# Patient Record
Sex: FEMALE | Race: NATIVE HAWAIIAN OR OTHER PACIFIC ISLANDER | NOT HISPANIC OR LATINO | Employment: UNEMPLOYED | URBAN - METROPOLITAN AREA
[De-identification: names, ages, dates, MRNs, and addresses within clinical notes are randomized per-mention and may not be internally consistent; named-entity substitution may affect disease eponyms.]

---

## 2023-03-01 ENCOUNTER — HOSPITAL ENCOUNTER (EMERGENCY)
Facility: MEDICAL CENTER | Age: 28
End: 2023-03-02
Attending: EMERGENCY MEDICINE
Payer: COMMERCIAL

## 2023-03-01 DIAGNOSIS — R45.851 SUICIDAL IDEATION: ICD-10-CM

## 2023-03-01 DIAGNOSIS — F32.2 CURRENT SEVERE EPISODE OF MAJOR DEPRESSIVE DISORDER WITHOUT PSYCHOTIC FEATURES WITHOUT PRIOR EPISODE (HCC): ICD-10-CM

## 2023-03-01 LAB
ALBUMIN SERPL BCP-MCNC: 4.8 G/DL (ref 3.2–4.9)
ALBUMIN/GLOB SERPL: 1.8 G/DL
ALP SERPL-CCNC: 64 U/L (ref 30–99)
ALT SERPL-CCNC: 16 U/L (ref 2–50)
AMPHET UR QL SCN: NEGATIVE
ANION GAP SERPL CALC-SCNC: 12 MMOL/L (ref 7–16)
APPEARANCE UR: CLEAR
AST SERPL-CCNC: 20 U/L (ref 12–45)
BACTERIA #/AREA URNS HPF: NEGATIVE /HPF
BARBITURATES UR QL SCN: NEGATIVE
BENZODIAZ UR QL SCN: NEGATIVE
BILIRUB SERPL-MCNC: 0.4 MG/DL (ref 0.1–1.5)
BILIRUB UR QL STRIP.AUTO: NEGATIVE
BUN SERPL-MCNC: 11 MG/DL (ref 8–22)
BZE UR QL SCN: NEGATIVE
CALCIUM ALBUM COR SERPL-MCNC: 9 MG/DL (ref 8.5–10.5)
CALCIUM SERPL-MCNC: 9.6 MG/DL (ref 8.5–10.5)
CANNABINOIDS UR QL SCN: NEGATIVE
CHLORIDE SERPL-SCNC: 102 MMOL/L (ref 96–112)
CO2 SERPL-SCNC: 22 MMOL/L (ref 20–33)
COLOR UR: YELLOW
CREAT SERPL-MCNC: 0.69 MG/DL (ref 0.5–1.4)
EPI CELLS #/AREA URNS HPF: ABNORMAL /HPF
ERYTHROCYTE [DISTWIDTH] IN BLOOD BY AUTOMATED COUNT: 41.7 FL (ref 35.9–50)
ETHANOL BLD-MCNC: <10.1 MG/DL
GFR SERPLBLD CREATININE-BSD FMLA CKD-EPI: 121 ML/MIN/1.73 M 2
GLOBULIN SER CALC-MCNC: 2.7 G/DL (ref 1.9–3.5)
GLUCOSE SERPL-MCNC: 83 MG/DL (ref 65–99)
GLUCOSE UR STRIP.AUTO-MCNC: NEGATIVE MG/DL
HCG SERPL QL: NEGATIVE
HCT VFR BLD AUTO: 41.3 % (ref 37–47)
HGB BLD-MCNC: 13.5 G/DL (ref 12–16)
HYALINE CASTS #/AREA URNS LPF: ABNORMAL /LPF
KETONES UR STRIP.AUTO-MCNC: 40 MG/DL
LEUKOCYTE ESTERASE UR QL STRIP.AUTO: NEGATIVE
MCH RBC QN AUTO: 28.9 PG (ref 27–33)
MCHC RBC AUTO-ENTMCNC: 32.7 G/DL (ref 33.6–35)
MCV RBC AUTO: 88.4 FL (ref 81.4–97.8)
METHADONE UR QL SCN: NEGATIVE
MICRO URNS: ABNORMAL
NITRITE UR QL STRIP.AUTO: NEGATIVE
OPIATES UR QL SCN: NEGATIVE
OXYCODONE UR QL SCN: NEGATIVE
PCP UR QL SCN: NEGATIVE
PH UR STRIP.AUTO: 6 [PH] (ref 5–8)
PLATELET # BLD AUTO: 241 K/UL (ref 164–446)
PMV BLD AUTO: 10.4 FL (ref 9–12.9)
POTASSIUM SERPL-SCNC: 4 MMOL/L (ref 3.6–5.5)
PROPOXYPH UR QL SCN: NEGATIVE
PROT SERPL-MCNC: 7.5 G/DL (ref 6–8.2)
PROT UR QL STRIP: NEGATIVE MG/DL
RBC # BLD AUTO: 4.67 M/UL (ref 4.2–5.4)
RBC # URNS HPF: ABNORMAL /HPF
RBC UR QL AUTO: ABNORMAL
SODIUM SERPL-SCNC: 136 MMOL/L (ref 135–145)
SP GR UR STRIP.AUTO: 1.02
TSH SERPL DL<=0.005 MIU/L-ACNC: 2.12 UIU/ML (ref 0.38–5.33)
UROBILINOGEN UR STRIP.AUTO-MCNC: 0.2 MG/DL
WBC # BLD AUTO: 9.1 K/UL (ref 4.8–10.8)
WBC #/AREA URNS HPF: ABNORMAL /HPF

## 2023-03-01 PROCEDURE — 84443 ASSAY THYROID STIM HORMONE: CPT

## 2023-03-01 PROCEDURE — 81001 URINALYSIS AUTO W/SCOPE: CPT

## 2023-03-01 PROCEDURE — 80307 DRUG TEST PRSMV CHEM ANLYZR: CPT

## 2023-03-01 PROCEDURE — 84703 CHORIONIC GONADOTROPIN ASSAY: CPT

## 2023-03-01 PROCEDURE — 80053 COMPREHEN METABOLIC PANEL: CPT

## 2023-03-01 PROCEDURE — 82077 ASSAY SPEC XCP UR&BREATH IA: CPT

## 2023-03-01 PROCEDURE — 85027 COMPLETE CBC AUTOMATED: CPT

## 2023-03-01 PROCEDURE — 36415 COLL VENOUS BLD VENIPUNCTURE: CPT

## 2023-03-01 PROCEDURE — 99285 EMERGENCY DEPT VISIT HI MDM: CPT

## 2023-03-02 VITALS
BODY MASS INDEX: 30.42 KG/M2 | HEART RATE: 78 BPM | DIASTOLIC BLOOD PRESSURE: 79 MMHG | SYSTOLIC BLOOD PRESSURE: 121 MMHG | WEIGHT: 193.78 LBS | TEMPERATURE: 97 F | HEIGHT: 67 IN | RESPIRATION RATE: 16 BRPM | OXYGEN SATURATION: 99 %

## 2023-03-02 RX ORDER — AMOXICILLIN 250 MG
2 CAPSULE ORAL DAILY
COMMUNITY

## 2023-03-02 NOTE — ED NOTES
Pt resting on gurney with eyes closed. Respirations even and unlabored. 1:1 sitter at bedside in direct pt view.

## 2023-03-02 NOTE — DISCHARGE PLANNING
Alert Team Note:    Contacted [facility], spoke to Leydi. Pt is on the pending list. No beds available at this time. Facility is expecting discharges later today.

## 2023-03-02 NOTE — ED NOTES
Patient resting on stretcher in no acute distress. Equal chest rise and fall noted. Sitter in doorway for direct 1:1 observation. Room safety checklist in use. No needs at this time, will continue to monitor

## 2023-03-02 NOTE — CONSULTS
"[facility]   TRIAGE ASSESSMENT    Name: Martin Ayers  MRN: [id number]  : 1995  Age: 27 y.o.  Date of assessment: 3/1/2023  PCP: No primary care provider on file.  Persons in attendance: Patient  Patient Location: [facility]    CHIEF COMPLAINT/PRESENTING ISSUE (as stated by pt, pts father):   Chief Complaint   Patient presents with    Psych Eval     Pt not feeling \"herself\", depressed, feels lost x2 months. Pt states these feelings come and go. Pt see's Psychiatrist Dr. Howard Castaneda.  Pt has flat affect in triage.    Suicidal Ideation     New thoughts of wanting to harm herself with a sharp object to cut herself. Pt has not had thoughts like these in the past.     PT BIB family after psychiatrist recommended that she go to ED d/t increasing hopelessness, depression, and suicidal thoughts.  Psychiatrist  also concerned with increasing possible psychosomatic symptoms such as twitches, grunts, moans and wished patient to have lab work done. PT presents as A + O x 4 with calm mood, flat affect, and appears to have trouble articulating feelings. PT and pt's father reports that patient has become more and more depressed since last August. PT reports that she has been having increasing thoughts of self harm (such as cutting herself or hitting herself with a hammer) but has not acted on it. PT also reports intermittent thoughts of suicide reporting that she climbed a bridge last month before aborting attempt. PT reports that she began seeing a psychiatrist, Dr Howard Castaneda ([phone]), and saw him for the second time earlier today. Other than that, pt has no psychiatric history and no inpatient history, denies taking any medications. Denies any drug use and reports that she consumes alcohol \"on special occasions.\" After consulting with ERP, pt would benefit from inpatient hospitalization.       CURRENT LIVING SITUATION/SOCIAL SUPPORT/FINANCIAL RESOURCES: PT lives with family and works " part-time for her [employer] company as a .    BEHAVIORAL HEALTH/SUBSTANCE USE TREATMENT HISTORY  Does patient/parent report a history of prior behavioral health/substance use treatment for patient?   No:    SAFETY ASSESSMENT - SELF  Does patient acknowledge current or past symptoms of dangerousness to self or is previous history noted? Yes -  pt has been experiencing  hopelessness, depression, and suicidal thoughts.   Does parent/significant other report patient has current or past symptoms of dangerousness to self? Yes - PT's father reports SI,   Does presenting problem suggest symptoms of dangerousness to self? Yes:     Past Current    Suicidal Thoughts: [x]  [x]    Suicidal Plans: [x]  [x]    Suicidal Intent: [x]  []    Suicide Attempts: []  []    Self-Injury []  []      For any boxes checked above, provide detail:     History of suicide by family member: no  History of suicide by friend/significant other: no  Recent change in frequency/specificity/intensity of suicidal thoughts or self-harm behavior? no  Current access to firearms, medications, or other identified means of suicide/self-harm? no  If yes, willing to restrict access to means of suicide/self-harm? no  Protective factors present:  Willing to address in treatment    SAFETY ASSESSMENT - OTHERS  Does patient acknowledge current or past symptoms of aggressive behavior or risk to others or is previous history noted? no  Does parent/significant other report patient has current or past symptoms of aggressive behavior or risk to others?  no  Does presenting problem suggest symptoms of dangerousness to others? No    LEGAL HISTORY  Does patient acknowledge history of arrest/[facility] or is previous history noted? no    Crisis Safety Plan completed and copy given to patient? N\A    ABUSE/NEGLECT SCREENING  Does patient report feeling “unsafe” in his/her home, or afraid of anyone?  no  Does patient report any history of physical, sexual,  "or emotional abuse?  no  Does parent or significant other report any of the above? no  Is there evidence of neglect by self?  no  Is there evidence of neglect by a caregiver? no  Does the patient/parent report any history of CPS/APS/police involvement related to suspected abuse/neglect or domestic violence? no  Based on the information provided during the current assessment, is a mandated report of suspected abuse/neglect being made?  No    SUBSTANCE USE SCREENING  Yes:  Dex all substances used in the past 30 days:    Denies any drug use and reports that she consumes alcohol \"on special occasions.\"       Last Use Amount   []   Alcohol Unknown Unknown    []   Marijuana     []   Heroin     []   Prescription Opioids  (used without prescription, for    recreation, or in excess of prescribed amount)     []   Other Prescription  (used without prescription, for    recreation, or in excess of prescribed amount)     []   Cocaine      []   Methamphetamine     []   \"\" drugs (ectasy, MDMA)     []   Other substances        UDS results: Negative  Breathalyzer results: 0.00      MENTAL STATUS   Participation: Limited verbal participation  Grooming: Casual  Orientation: Alert and Fully Oriented  Behavior: Hypoactive  Eye contact: Limited  Mood: Depressed  Affect: Constricted, Flat, and Sad  Thought process: Logical and Goal-directed  Thought content: Within normal limits  Speech: Rate within normal limits and Volume within normal limits  Perception: Within normal limits  Memory:  Recent:  Adequate  Insight: Adequate  Judgment:  Limited  Other:    Collateral information:   Source:  [] Significant other present in person:   [] Significant other by telephone  [] [facility]   [x] [facility] Nursing Staff  [x] [facility]  [x] Other: ERP    [] Unable to complete full assessment due to:  [] Acute intoxication  [] Patient declined to participate/engage  [] Patient verbally unresponsive  [] Significant cognitive " deficits  [] Significant perceptual distortions or behavioral disorganization  [] Other:      CLINICAL IMPRESSIONS:  Primary:  SI  Secondary:  MDD       IDENTIFIED NEEDS/PLAN:  [Trigger DISPOSITION list for any items marked]    [x]  Imminent safety risk - self [] Imminent safety risk - others   []  Acute substance withdrawal []  Psychosis/Impaired reality testing   [x]  Mood/anxiety []  Substance use/Addictive behavior   [x]  Maladaptive behaviro []  Parent/child conflict   []  Family/Couples conflict []  Biomedical   []  Housing []  Financial   []   Legal  Occupational/Educational   []  Domestic violence []  Other:     Recommended Plan of Care:   Actively being addressed by Legal Hold and [facility] and 1:1 Observation; [facility]; Continue pt level of observation per the [place] Suicide Severity Rating Scale (C-SSRS) assessment completed by [facility] ED RN, currently at medium risk; [employer], [facility]  *Telesitter may not be utilized for moderate or high risk patients    Has the Recommended Plan of Care/Level of Observation been reviewed with the patient's assigned nurse? yes    Does patient/parent or guardian express agreement with the above plan? yes      Referral appointment(s) scheduled? N\A    Alert team only:   I have discussed findings and recommendations with Dr. Richardson who is in agreement with these recommendations.     Referral information sent to the following outpatient community providers :    Referral information sent to the following inpatient community providers :    If applicable : Referred  to  Alert Team for legal hold follow up at (time): JESSICA Zheng R.N.  3/1/2023

## 2023-03-02 NOTE — ED NOTES
Patient reassessed. VSS. Denies any changes at this time. Patient in no signs of distress. Pt remains in 1:1 observation of sitter

## 2023-03-02 NOTE — ED NOTES
Patient resting on gurney. No acute distress. Active chest rise noted. No agitation noted. No behavioral pain indicators. 1:1 sitter in direct view of patient.

## 2023-03-02 NOTE — PROGRESS NOTES
"ED Observation Progress Note    Date of Service: 03/02/23    Interval History and Interventions  Patient presenting with suicidal ideation and worsening depression.  She has been placed on a legal hold and medically cleared and pending transfer to inpatient psychiatric facility    No issues through the shift today, patient's care will be transferred to the oncoming emergency physician    Physical Exam  /71   Pulse 72   Temp 36.4 °C (97.6 °F) (Temporal)   Resp 16   Ht 1.702 m (5' 7\")   Wt 87.9 kg (193 lb 12.6 oz)   SpO2 99%   BMI 30.35 kg/m² .    Constitutional: Awake and alert. Nontoxic  HENT:  Grossly normal  Eyes: Grossly normal  Neck: Normal range of motion  Cardiovascular: Normal heart rate   Thorax & Lungs: No respiratory distress  Abdomen: Nontender  Skin:  No pathologic rash.   Extremities: Well perfused  Psychiatric: Depressed    Labs  Results for orders placed or performed during the hospital encounter of 03/01/23   CBC WITHOUT DIFFERENTIAL   Result Value Ref Range    WBC 9.1 4.8 - 10.8 K/uL    RBC 4.67 4.20 - 5.40 M/uL    Hemoglobin 13.5 12.0 - 16.0 g/dL    Hematocrit 41.3 37.0 - 47.0 %    MCV 88.4 81.4 - 97.8 fL    MCH 28.9 27.0 - 33.0 pg    MCHC 32.7 (L) 33.6 - 35.0 g/dL    RDW 41.7 35.9 - 50.0 fL    Platelet Count 241 164 - 446 K/uL    MPV 10.4 9.0 - 12.9 fL   COMP METABOLIC PANEL   Result Value Ref Range    Sodium 136 135 - 145 mmol/L    Potassium 4.0 3.6 - 5.5 mmol/L    Chloride 102 96 - 112 mmol/L    Co2 22 20 - 33 mmol/L    Anion Gap 12.0 7.0 - 16.0    Glucose 83 65 - 99 mg/dL    Bun 11 8 - 22 mg/dL    Creatinine 0.69 0.50 - 1.40 mg/dL    Calcium 9.6 8.5 - 10.5 mg/dL    AST(SGOT) 20 12 - 45 U/L    ALT(SGPT) 16 2 - 50 U/L    Alkaline Phosphatase 64 30 - 99 U/L    Total Bilirubin 0.4 0.1 - 1.5 mg/dL    Albumin 4.8 3.2 - 4.9 g/dL    Total Protein 7.5 6.0 - 8.2 g/dL    Globulin 2.7 1.9 - 3.5 g/dL    A-G Ratio 1.8 g/dL   HCG QUAL SERUM   Result Value Ref Range    Beta-Hcg Qualitative Serum " Negative Negative   URINALYSIS (UA)    Specimen: Urine   Result Value Ref Range    Color Yellow     Character Clear     Specific Gravity 1.023 <1.035    Ph 6.0 5.0 - 8.0    Glucose Negative Negative mg/dL    Ketones 40 (A) Negative mg/dL    Protein Negative Negative mg/dL    Bilirubin Negative Negative    Urobilinogen, Urine 0.2 Negative    Nitrite Negative Negative    Leukocyte Esterase Negative Negative    Occult Blood Small (A) Negative    Micro Urine Req Microscopic    URINE DRUG SCREEN   Result Value Ref Range    Amphetamines Urine Negative Negative    Barbiturates Negative Negative    Benzodiazepines Negative Negative    Cocaine Metabolite Negative Negative    Methadone Negative Negative    Opiates Negative Negative    Oxycodone Negative Negative    Phencyclidine -Pcp Negative Negative    Propoxyphene Negative Negative    Cannabinoid Metab Negative Negative   DIAGNOSTIC ALCOHOL   Result Value Ref Range    Diagnostic Alcohol <10.1 <10.1 mg/dL   TSH WITH REFLEX TO FT4   Result Value Ref Range    TSH 2.120 0.380 - 5.330 uIU/mL   CORRECTED CALCIUM   Result Value Ref Range    Correct Calcium 9.0 8.5 - 10.5 mg/dL   ESTIMATED GFR   Result Value Ref Range    GFR (CKD-EPI) 121 >60 mL/min/1.73 m 2   URINE MICROSCOPIC (W/UA)   Result Value Ref Range    WBC 0-2 /hpf    RBC 10-20 (A) /hpf    Bacteria Negative None /hpf    Epithelial Cells Few /hpf    Hyaline Cast 0-2 /lpf       Radiology  No orders to display       Problem List  1.  Suicidal ideation  2.  Depression    Electronically signed by: Herve Romero M.D., 3/2/2023 6:10 AM

## 2023-03-02 NOTE — DISCHARGE PLANNING
Alert Team Note:    Contacted [facility], spoke to Yovana. Per Yovana, this pt has been declined. [facility] is not contracted with [facility] insurance, [facility].

## 2023-03-02 NOTE — ED NOTES
Rounded on pt, no needs at this time. Pt informed that if she is hungry or wants needs anything to let staff know.      1:1 sitter at bedside in direct pt view.

## 2023-03-02 NOTE — ED TRIAGE NOTES
"Chief Complaint   Patient presents with    Psych Eval     Pt not feeling \"herself\", depressed, feels lost x2 months. Pt states these feelings come and go. Pt see's Psychiatrist Dr. Howard Castaneda.  Pt has flat affect in triage.    Suicidal Ideation     New thoughts of wanting to harm herself with a sharp object to cut herself. Pt has not had thoughts like these in the past.      Pt BIB family for above complaints. Pt was seen by her psychiatrist today at 1300 and was sent here for a medical work up.  Dr. Howard Castaneda [phone]     BP (!) 136/94   Pulse 86   Temp 37.2 °C (98.9 °F) (Temporal)   Resp 16   Ht 1.702 m (5' 7\")   Wt 87.9 kg (193 lb 12.6 oz)   SpO2 100%   BMI 30.35 kg/m²     "

## 2023-03-02 NOTE — DISCHARGE PLANNING
[facility] ED Behavioral Health Fax Referral      [facility] ED Behavioral Health Alert Team:  [phone]    Referral: Legal Hold    Intervention: Patient referral to [facility] inpatient  facillity    Legal Hold Initiated: Date: 03/01/2023 Time: 2200    Patient’s Insurance Listed on Face Sheet: [employer]*    Referrals sent to: [facility], [facility], [facility]    Referrals faxed by Mag BREWER    This referral contains the following information:  Face sheet __x__  Page 1 and Page 2 of Legal Hold __x__  Alert Team Assessment/Psych Assessment __x__  Head to toe physical exam __x__  Urine Drug Screen __x__  Blood Alcohol __x__  Vital signs _x___  Pregnancy test when applicable ___  Medications list __x__  Covid screening ____    Plan: Patient will transfer to mental health facility once acceptance is obtained

## 2023-03-02 NOTE — ED NOTES
Patient resting and given warm meal tray. Sitter in doorway for direct 1:1 observation. Room safety checklist in use. No needs at this time, will continue to monitor

## 2023-03-02 NOTE — ED NOTES
Med rec completed per patient at bedside.  Allergies reviewed with patient. DIVINADA.  [facility] on [address].     Patient denies using any prescription medications at home.  No recent over-the-counter medications.  No outpatient antibiotics within the last 30 days.

## 2023-03-02 NOTE — ED PROVIDER NOTES
"ER Provider Note    Scribed for Kyler Richardson D.O. by Norman Mcgee. 3/1/2023  5:39 PM    Primary Care Provider: Clau De La Torre D.O. (Inactive)    CHIEF COMPLAINT  Chief Complaint   Patient presents with    Psych Eval     Pt not feeling \"herself\", depressed, feels lost x2 months. Pt states these feelings come and go. Pt see's Psychiatrist Dr. Howard Castaneda.  Pt has flat affect in triage.    Suicidal Ideation     New thoughts of wanting to harm herself with a sharp object to cut herself. Pt has not had thoughts like these in the past.        HPI/ROS    OUTSIDE HISTORIAN(S):  Family at bedside provided note from therapist who requests medical clearance, has concerns about patients depression and loneliness    Martin Ayers is a 27 y.o. female who presents to the Emergency Department for depression onset last year but worsening lately. She currently states that she is feeling weird. She endorses feeling episodes of sadness, and that these episodes are increasing in frequency. These episodes cause a range of symptoms including twitching, loss of appetite and sudden movement. She states that she is feeling things that she cannot explain. She saw her therapist today who thinks that this is medically related and would like for her to have tests done to determine etiology. She denies any nausea, vomiting, diarrhea. She states that during one of her episodes she was reportedly warm but does not remember this. She denies any dysuria, chest pain, shortness of breath. She denies any major medical history, although her blood pressure was high in triage. She denies a family history of any major problems. She denies any chance of pregnancy.    ROS as per HPI.    PAST MEDICAL HISTORY  History reviewed. No pertinent past medical history.    SURGICAL HISTORY  History reviewed. No pertinent surgical history.    FAMILY HISTORY  History reviewed. No pertinent family history.    SOCIAL HISTORY   reports that she has never smoked. She " "has never used smokeless tobacco. She reports that she does not drink alcohol and does not use drugs.    CURRENT MEDICATIONS  Previous Medications    No medications on file     ALLERGIES  Patient has no known allergies.    PHYSICAL EXAM  BP (!) 136/94   Pulse 86   Temp 37.2 °C (98.9 °F) (Temporal)   Resp 16   Ht 1.702 m (5' 7\")   Wt 87.9 kg (193 lb 12.6 oz)   SpO2 100%   BMI 30.35 kg/m²     General: No acute distress.  HENT: Normocephalic, Mucus membranes are moist.   Chest: Lungs have even and unlabored respirations, Clear to auscultation.   Cardiovascular: Regular rate and regular rhythm, No peripheral cyanosis.  Abdomen: Non distended.  Neuro: Awake, Conversive, Able to relay recent events.  Psychiatric: Calm and cooperative. Flat affect.    EXTERNAL RECORDS REVIEWED  No visits for psychiatric problems    INITIAL ASSESSMENT  Patient presents with malaise, fatigue, feelings of sadness, other nonspecific claims. Will evaluate for electrolyte imbalance, infection, and pregnancy. If medically cleared, will be evaluated by [facility].    ED Observation Status? Yes; I am placing the patient in to an observation status due to a diagnostic uncertainty as well as therapeutic intensity. Patient placed in observation status at 5:39 PM, 3/1/2023.     Observation plan is as follows: Monitor for lab results and serial reevaluation        DIAGNOSTIC STUDIES    Labs:   Results for orders placed or performed during the hospital encounter of 03/01/23   CBC WITHOUT DIFFERENTIAL   Result Value Ref Range    WBC 9.1 4.8 - 10.8 K/uL    RBC 4.67 4.20 - 5.40 M/uL    Hemoglobin 13.5 12.0 - 16.0 g/dL    Hematocrit 41.3 37.0 - 47.0 %    MCV 88.4 81.4 - 97.8 fL    MCH 28.9 27.0 - 33.0 pg    MCHC 32.7 (L) 33.6 - 35.0 g/dL    RDW 41.7 35.9 - 50.0 fL    Platelet Count 241 164 - 446 K/uL    MPV 10.4 9.0 - 12.9 fL   COMP METABOLIC PANEL   Result Value Ref Range    Sodium 136 135 - 145 mmol/L    Potassium 4.0 3.6 - 5.5 mmol/L    Chloride " 102 96 - 112 mmol/L    Co2 22 20 - 33 mmol/L    Anion Gap 12.0 7.0 - 16.0    Glucose 83 65 - 99 mg/dL    Bun 11 8 - 22 mg/dL    Creatinine 0.69 0.50 - 1.40 mg/dL    Calcium 9.6 8.5 - 10.5 mg/dL    AST(SGOT) 20 12 - 45 U/L    ALT(SGPT) 16 2 - 50 U/L    Alkaline Phosphatase 64 30 - 99 U/L    Total Bilirubin 0.4 0.1 - 1.5 mg/dL    Albumin 4.8 3.2 - 4.9 g/dL    Total Protein 7.5 6.0 - 8.2 g/dL    Globulin 2.7 1.9 - 3.5 g/dL    A-G Ratio 1.8 g/dL   HCG QUAL SERUM   Result Value Ref Range    Beta-Hcg Qualitative Serum Negative Negative   URINALYSIS (UA)    Specimen: Urine   Result Value Ref Range    Color Yellow     Character Clear     Specific Gravity 1.023 <1.035    Ph 6.0 5.0 - 8.0    Glucose Negative Negative mg/dL    Ketones 40 (A) Negative mg/dL    Protein Negative Negative mg/dL    Bilirubin Negative Negative    Urobilinogen, Urine 0.2 Negative    Nitrite Negative Negative    Leukocyte Esterase Negative Negative    Occult Blood Small (A) Negative    Micro Urine Req Microscopic    URINE DRUG SCREEN   Result Value Ref Range    Amphetamines Urine Negative Negative    Barbiturates Negative Negative    Benzodiazepines Negative Negative    Cocaine Metabolite Negative Negative    Methadone Negative Negative    Opiates Negative Negative    Oxycodone Negative Negative    Phencyclidine -Pcp Negative Negative    Propoxyphene Negative Negative    Cannabinoid Metab Negative Negative   DIAGNOSTIC ALCOHOL   Result Value Ref Range    Diagnostic Alcohol <10.1 <10.1 mg/dL   TSH WITH REFLEX TO FT4   Result Value Ref Range    TSH 2.120 0.380 - 5.330 uIU/mL   CORRECTED CALCIUM   Result Value Ref Range    Correct Calcium 9.0 8.5 - 10.5 mg/dL   ESTIMATED GFR   Result Value Ref Range    GFR (CKD-EPI) 121 >60 mL/min/1.73 m 2   URINE MICROSCOPIC (W/UA)   Result Value Ref Range    WBC 0-2 /hpf    RBC 10-20 (A) /hpf    Bacteria Negative None /hpf    Epithelial Cells Few /hpf    Hyaline Cast 0-2 /lpf        All labs reviewed by me.     COURSE  & MEDICAL DECISION MAKING     COURSE AND PLAN  5:39 PM - Patient seen and examined at bedside. Discussed plan of care, including lab work up and consult with [facility]. Patient agrees to the plan of care. Ordered for urine drug screen, diagnostic alcohol, TSH with reflex to FT4, CBC w/o diff, CMP, HCG qual and UA to evaluate her symptoms.     ED Summary: patient valuated by alert cuellar, pt is danger to self.  L2K complete     DISPOSITION AND DISCUSSIONS  I have discussed management of the patient with the following physicians and STEPHANIE's:  Alert eam  recommend L2 k    Discussion of management with other QHP or appropriate source(s): [facility] will evaluate the patient    Transfer when available OTW ed obs    FINAL DIAGNOSIS  1. Suicidal ideation    2. Current severe episode of major depressive disorder without psychotic features without prior episode (HCC)        Norman EVANS (Scribe), am scribing for, and in the presence of, Kyler Richardson D.O..    Electronically signed by: Norman Mcgee (Javieribe), 3/1/2023    Kyler EVANS D.O. personally performed the services described in this documentation, as scribed by Norman Mcgee in my presence, and it is both accurate and complete.    The note accurately reflects work and decisions made by me.  Kyler Richardson D.O.  3/1/2023  10:20 PM

## 2023-03-03 NOTE — ED NOTES
Pt revitaled and reasses. Given water and juice. Tolerated well. Pt is now able to communicate with staff.

## 2023-03-03 NOTE — ED NOTES
Pt resting on stretcher in no acute distress. Equal chest rise noted. Bed locked and in lowest position. Direct observation continues with 1:1 sitter in doorway

## 2023-03-03 NOTE — ED NOTES
Pt transferred to [facility] with EMS. All pt belongings and transfer packet with legal hold paperwork given to EMS. Pt vitals and condition stable for transport.

## 2023-03-03 NOTE — ED NOTES
Pt transferred to ... with EMS. All pt belongings and transfer packet with legal hold paperwork given to EMS. Pt vitals and condition stable for transport.

## 2023-03-03 NOTE — ED NOTES
Patient resting on stretcher in no acute distress. Equal chest rise noted. 1:1 sitter outside of room

## 2023-03-03 NOTE — DISCHARGE PLANNING
Legal Hold Transfer     Referral: Legal hold transfer to Mental Health Facility     Intervention: Informed by Zeenat at [facility] that pt has been accepted.     Pt's accepting physcian is Dr. Clemons     Transport arranged through William at [facility]    Requested authorization number from [facility] with [facility].    The pt will be picked up at 2300      Notified Bedside RN Karen and Alert Team RN Carolyn of the departure time as well as accepting facility.      COBRA and transfer packet to be created with original legal hold and placed on chart by Alert Team RN.      Plan: Pt will be transferring to [facility] via [facility] at 2300.

## 2023-03-03 NOTE — ED NOTES
Patient's home medications have been reviewed by the pharmacy team.    History reviewed. No pertinent past medical history.    Patient's Medications   New Prescriptions    No medications on file   Previous Medications    ASCORBIC ACID (VITAMIN C PO)    Take 1 Tablet by mouth every day.    CHOLECALCIFEROL (VITAMIN D3 PO)    Take 1 Capsule by mouth every day.    NUTRITIONAL SUPPLEMENTS (FRUIT & VEGETABLE DAILY) CAP    Take 2 Capsules by mouth every day.    VITAMIN E PO    Take 1 Capsule by mouth every day.   Modified Medications    No medications on file   Discontinued Medications    No medications on file     CC: SI + depression  PMH: Unknown but sees psychiatrist (Dr. Castaneda)    Plan is for eval with [facility]     A:  Medications do not appear to be contributing to current complaints.     P:    No recommendations at this time. Home medications have been reordered as appropriate.    Dominic Capps PhT

## 2023-03-03 NOTE — DISCHARGE PLANNING
Alert Team Note:    Contacted by [facility], spoke to Zeenat. Pt has been accepted, accepting is Dr. Clemons. Facility expects transport at 2300.  Writer will set up transport.